# Patient Record
Sex: FEMALE | Race: WHITE
[De-identification: names, ages, dates, MRNs, and addresses within clinical notes are randomized per-mention and may not be internally consistent; named-entity substitution may affect disease eponyms.]

---

## 2019-06-04 ENCOUNTER — HOSPITAL ENCOUNTER (INPATIENT)
Dept: HOSPITAL 74 - FER | Age: 79
LOS: 4 days | Discharge: HOME HEALTH | DRG: 540 | End: 2019-06-08
Attending: NURSE PRACTITIONER | Admitting: GENERAL ACUTE CARE HOSPITAL
Payer: COMMERCIAL

## 2019-06-04 VITALS — BODY MASS INDEX: 28.7 KG/M2

## 2019-06-04 DIAGNOSIS — M46.46: ICD-10-CM

## 2019-06-04 DIAGNOSIS — F41.9: ICD-10-CM

## 2019-06-04 DIAGNOSIS — E78.5: ICD-10-CM

## 2019-06-04 DIAGNOSIS — I10: ICD-10-CM

## 2019-06-04 DIAGNOSIS — E87.1: ICD-10-CM

## 2019-06-04 DIAGNOSIS — M86.9: Primary | ICD-10-CM

## 2019-06-04 LAB
ALBUMIN SERPL-MCNC: 3.7 G/DL (ref 3.4–5)
ALP SERPL-CCNC: 65 U/L (ref 45–117)
ALT SERPL-CCNC: 11 U/L (ref 13–61)
ANION GAP SERPL CALC-SCNC: 11 MMOL/L (ref 8–16)
ANION GAP SERPL CALC-SCNC: 12 MMOL/L (ref 8–16)
ANION GAP SERPL CALC-SCNC: 9 MMOL/L (ref 8–16)
AST SERPL-CCNC: 22 U/L (ref 15–37)
BASOPHILS # BLD: 0.2 % (ref 0–2)
BILIRUB SERPL-MCNC: 0.7 MG/DL (ref 0.2–1)
BUN SERPL-MCNC: 16 MG/DL (ref 7–18)
BUN SERPL-MCNC: 17 MG/DL (ref 7–18)
BUN SERPL-MCNC: 18 MG/DL (ref 7–18)
CALCIUM SERPL-MCNC: 7.7 MG/DL (ref 8.5–10)
CALCIUM SERPL-MCNC: 8.1 MG/DL (ref 8.5–10)
CALCIUM SERPL-MCNC: 8.4 MG/DL (ref 8.5–10)
CHLORIDE SERPL-SCNC: 101 MMOL/L (ref 98–107)
CHLORIDE SERPL-SCNC: 93 MMOL/L (ref 98–107)
CHLORIDE SERPL-SCNC: 95 MMOL/L (ref 98–107)
CO2 SERPL-SCNC: 22 MMOL/L (ref 21–32)
CO2 SERPL-SCNC: 23 MMOL/L (ref 21–32)
CO2 SERPL-SCNC: 24 MMOL/L (ref 21–32)
CREAT SERPL-MCNC: 0.9 MG/DL (ref 0.55–1.3)
DEPRECATED RDW RBC AUTO: 12.8 % (ref 11.6–15.6)
EOSINOPHIL # BLD: 0.1 % (ref 0–4.5)
GLUCOSE SERPL-MCNC: 109 MG/DL (ref 74–106)
GLUCOSE SERPL-MCNC: 166 MG/DL (ref 74–106)
GLUCOSE SERPL-MCNC: 99 MG/DL (ref 74–106)
HCT VFR BLD CALC: 33.1 % (ref 32.4–45.2)
HGB BLD-MCNC: 11.1 GM/DL (ref 10.7–15.3)
HYALINE CASTS URNS QL MICRO: (no result) /LPF
LYMPHOCYTES # BLD: 7.7 % (ref 8–40)
MCH RBC QN AUTO: 30.8 PG (ref 25.7–33.7)
MCHC RBC AUTO-ENTMCNC: 33.7 G/DL (ref 32–36)
MCV RBC: 91.6 FL (ref 80–96)
MONOCYTES # BLD AUTO: 5.5 % (ref 3.8–10.2)
MUCOUS THREADS URNS QL MICRO: (no result)
NEUTROPHILS # BLD: 86.5 % (ref 42.8–82.8)
PLATELET # BLD AUTO: 175 K/MM3 (ref 134–434)
PMV BLD: 9.5 FL (ref 7.5–11.1)
POTASSIUM SERPLBLD-SCNC: 3.3 MMOL/L (ref 3.5–5.1)
POTASSIUM SERPLBLD-SCNC: 3.4 MMOL/L (ref 3.5–5.1)
POTASSIUM SERPLBLD-SCNC: 3.8 MMOL/L (ref 3.5–5.1)
PROT SERPL-MCNC: 6.5 G/DL (ref 6.4–8.2)
RBC # BLD AUTO: 3.61 M/MM3 (ref 3.6–5.2)
SODIUM SERPL-SCNC: 128 MMOL/L (ref 136–145)
SODIUM SERPL-SCNC: 130 MMOL/L (ref 136–145)
SODIUM SERPL-SCNC: 132 MMOL/L (ref 136–145)
WBC # BLD AUTO: 9.7 K/MM3 (ref 4–10)

## 2019-06-04 PROCEDURE — C1887 CATHETER, GUIDING: HCPCS

## 2019-06-04 PROCEDURE — G0378 HOSPITAL OBSERVATION PER HR: HCPCS

## 2019-06-04 PROCEDURE — C1751 CATH, INF, PER/CENT/MIDLINE: HCPCS

## 2019-06-04 PROCEDURE — G0480 DRUG TEST DEF 1-7 CLASSES: HCPCS

## 2019-06-04 NOTE — PDOC
History of Present Illness





- General


Chief Complaint: Back Pain


Stated Complaint: BACK PAIN,FEVER,CHILLS


Time Seen by Provider: 06/04/19 08:51


History Source: Patient


Exam Limitations: No Limitations





- History of Present Illness


Initial Comments: 





06/04/19 08:52


79 yo F with h/o chronic back pain, HTN, HLD arthritis, her with c/o fever and 

myalgia, chills. states she had fever 102 at home.  took tylenol this am. pt 

states she recieved a steroid injection in her back on 5/23. and again on 5/29. 

here visiting from [place]. last night developed fever. has had a cough for few 

days, no n/v no abd pain. no urinary complaints. pain is at baseline in her 

back no worse. no new weakness or numbness in her legs. no rash. no recent 

travel. no n/v/d.


meds: simvastatin, hctz, meloxicam, 


06/04/19 09:35





06/04/19 10:48








Past History





- Past Medical History


Allergies/Adverse Reactions: 


 Allergies











Allergy/AdvReac Type Severity Reaction Status Date / Time


 


No Known Allergies Allergy   Verified 06/04/19 08:55











Home Medications: 


Ambulatory Orders





Clonazepam 0 mg PO ASDIR 06/04/19 


Simvastatin 0 mg PO DAILY 06/04/19 











- Suicide/Smoking/Psychosocial Hx


Smoking History: Never smoked


Hx Alcohol Use: No


Drug/Substance Use Hx: No





**Review of Systems





- Review of Systems


Constitutional: Yes: Chills, Fever


HEENTM: No: Eye Pain


Respiratory: Yes: Cough.  No: Orthopnea, Shortness of Breath


Cardiac (ROS): No: Chest Pain, Edema


: No: Burning, Dysuria


Musculoskeletal: Yes: Back Pain.  No: Joint Pain


Integumentary: No: Bruising, Change in Color


All Other Systems: Reviewed and Negative





*Physical Exam





- Vital Signs


 Last Vital Signs











Temp Pulse Resp BP Pulse Ox


 


 99.5 F   80   20   109/56 L  96 


 


 06/04/19 08:46  06/04/19 08:46  06/04/19 08:46  06/04/19 08:46  06/04/19 08:46














- Physical Exam


Comments: 





06/04/19 08:54


awake alert NAD. dry mucous membranes. lungs crackles right lung fields 

anteriorly. heart rrr  no mrg abd soft nt nd ext wwp no edema. no calf 

tenderness. alert oriented x 3. skin warm and dry no rash. no midline spinal 

tenderness c/t/l spine. 5/5 lower ext strength. sensation intact throughout 

lower ext. 


06/04/19 09:53








**Heart Score/ECG Review


  ** #1


General ECG Interpretation: Sinus Rhythm, Normal Rate (78), Normal Intervals, 

No acute ischemic changes





ED Treatment Course





- LABORATORY


CBC & Chemistry Diagram: 


 06/04/19 09:00





 06/04/19 09:15





- RADIOLOGY


Radiology Studies Ordered: 














 Category Date Time Status


 


 CHEST PA & LAT [RAD] Stat Radiology  06/04/19 08:51 Ordered














Medical Decision Making





- Medical Decision Making





06/04/19 08:54


79 yo F h/o arthritis djd back pain recent injections 1 week ago here with 

cough fever. differential uti, pyelo, pna, sepsis, no midline tenderness over 

spine to suggest epidural abscess. plan labs ua cxr. if no source for fever 

consider mri r/o abscess. 


06/04/19 08:56


pain management doctor  Dr Díaz.  d/w dr díaz had medial 

nerves near facet joints, not near epidural space, but stil possible to seed in 

joint space. 








06/04/19 13:04


called pt pcp dr Abhishek Forrester  regaridng low sodium 128. MrI pending. 

called to request read. 


06/04/19 14:12


pt with mild headache. possible symptomatic hyponatriema, given 1 L NS will 

repeat. due to persistant fevers, hypoxia 93 %. will admit. cxr negative. 


06/04/19 14:31


mri findings wtih degenerative changes. no epidural fluid collection. however 

severe facet joint arthritis, difficult to differential inflammatory vs. 

infectious appearance per radiology . will cover vanco and zosy. pt with cough, 

sick contacts daughter and granddaughter with cough viral syndrome uri. however 

due to recent procedure, will cover for possible synovitis of spine. d/w Lana 

for admission Phylicia casiano, will cosult infectious disease on admission.





*DC/Admit/Observation/Transfer


Diagnosis at time of Disposition: 


 Hyponatremia, Cough








- Discharge Dispostion


Condition at time of disposition: Fair


Decision to Admit order: Yes





- Referrals





- Patient Instructions





- Post Discharge Activity

## 2019-06-04 NOTE — HP
CHIEF COMPLAINT: Fever, chills





PCP: in [place]





HISTORY OF PRESENT ILLNESS:


78 year-old female with a PMH significant for HTN, HLD, OA, and chronic back 

pain. Arrived in [place] on 5/30 from [place] to visit daughter. Presented to ED with 

complaint of home-recorded fever of 102.9, myalgias, and chills since last 

night. Patient reports having lumbar spine steroid injections for the first 

time on 5/23 and 5/29 with a pain management doctor in [place]. Patient has had 

a mild non-productive cough, visiting with young family members with assorted 

upper respiratory symptoms. No nausea, vomiting, diarrhea. No dysuria, frequency

, urgency. 





ER course was notable for:


(1) Na 129


(2) MRI LSS with contrast: active inflammatory facet synovitis L3-L4-L5 with 

soft tissue inflammation; L5-S1 soft tissue inflammation AND bone marrow 

enhancement





Recent Travel:


From [facility] to [place] 5 days ago; was supposed to fly back today





PAST MEDICAL HISTORY:


Hypertension


Hyperlipidemia


Osteoarthritis


Chronic back pain





PAST SURGICAL HISTORY:


Bilateral meniscus repairs


DANI/BLSO


Sinus polyps





Social History: retired , lives with  


Smoking: quit 50 years ago


Alcohol: rare


Drugs: no





Family History: non-contributory





Allergies


No Known Allergies Allergy (Verified 06/04/19 08:55)


 





 Home Medications











 Medication  Instructions  Recorded


 


Benazepril/Hydrochlorothiazide 1 each PO BID 06/04/19





[Benazepril-Hctz 10-12.5 mg Tab]  


 


Clonazepam 1 mg PO HS 06/04/19


 


Lamotrigine 100 mg PO BID 06/04/19


 


Meloxicam [Mobic (Nf) -] 15 mg PO DAILY 06/04/19


 


Pramipexole Dihydrochloride 0.25 mg PO HS 06/04/19


 


Simvastatin 20 mg PO HS 06/04/19


 


traZODone HCL [Trazodone HCl] 50 mg PO HS 06/04/19














REVIEW OF SYSTEMS


CONSTITUTIONAL: 


+fever, chills


Absent:  diaphoresis, generalized weakness, malaise, loss of appetite, weight 

change


HEENT: 


Absent:  rhinorrhea, nasal congestion, throat pain, throat swelling, difficulty 

swallowing, mouth swelling, ear pain, eye pain, visual changes


CARDIOVASCULAR: 


Absent: chest pain, syncope, palpitations, irregular heart rate, lightheadedness

, peripheral edema


RESPIRATORY: 


Absent: cough, shortness of breath, dyspnea with exertion, orthopnea, wheezing, 

stridor, hemoptysis


GASTROINTESTINAL:


Absent: abdominal pain, abdominal distension, nausea, vomiting, diarrhea, 

constipation, melena, hematochezia


GENITOURINARY: 


Absent: dysuria, frequency, urgency, hesitancy, hematuria, flank pain, genital 

pain


MUSCULOSKELETAL: 


+back pain


Absent: myalgia, arthralgia, joint swelling, neck pain


SKIN: 


Absent: rash, itching, pallor


HEMATOLOGIC/IMMUNOLOGIC: 


Absent: easy bleeding, easy bruising, lymphadenopathy, frequent infections


ENDOCRINE:


Absent: unexplained weight gain, unexplained weight loss, heat intolerance, 

cold intolerance


NEUROLOGIC: 


Absent: headache, focal weakness or paresthesias, dizziness, unsteady gait, 

seizure, mental status changes, bladder or bowel incontinence


PSYCHIATRIC: 


Absent: anxiety, depression, suicidal or homicidal ideation, hallucinations.








PHYSICAL EXAMINATION


 Vital Signs - 24 hr











  06/04/19 06/04/19





  08:46 13:22


 


Temperature 99.5 F 102.9 F H


 


Pulse Rate 80 


 


Pulse Rate [  78





Right Radial]  


 


Respiratory 20 





Rate  


 


Blood Pressure 109/56 L 


 


Blood Pressure  95/68





[Left]  


 


O2 Sat by Pulse 96 95





Oximetry (%)  











GENERAL: Awake, alert, and fully oriented, in no acute distress. Well-

appearing. Mildly anxious.


LUNGS: Breath sounds equal, clear to auscultation bilaterally. No wheezes, and 

no crackles. No accessory muscle use.


HEART: Regular rate and rhythm, normal S1 and S2 without murmur, rub or gallop.


ABDOMEN: Soft, nontender, not distended, normoactive bowel sounds, no guarding, 

no rebound, no masses.  


MUSCULOSKELETAL: Normal range of motion at all joints. No bony deformities or 

tenderness. No CVA tenderness.


UPPER EXTREMITIES: 2+ pulses, warm, well-perfused. No cyanosis. No clubbing. No 

peripheral edema.


LOWER EXTREMITIES: 2+ pulses, warm, well-perfused. No calf tenderness. No 

peripheral edema. 


NEUROLOGICAL:  Cranial nerves II-XII intact. Normal speech. 





 Laboratory Results - last 24 hr











  06/04/19 06/04/19 06/04/19





  09:00 09:00 09:00


 


WBC  9.7  


 


RBC  3.61  


 


Hgb  11.1  


 


Hct  33.1  


 


MCV  91.6  


 


MCH  30.8  


 


MCHC  33.7  


 


RDW  12.8  


 


Plt Count  175  


 


MPV  9.5  


 


Absolute Neuts (auto)  8.4 H  


 


Neutrophils %  86.5 H  


 


Lymphocytes %  7.7 L  


 


Monocytes %  5.5  


 


Eosinophils %  0.1  


 


Basophils %  0.2  


 


Nucleated RBC %  0  


 


ESR   


 


Sodium   


 


Potassium   


 


Chloride   


 


Carbon Dioxide   


 


Anion Gap   


 


BUN   


 


Creatinine   


 


Est GFR (CKD-EPI)AfAm   


 


Est GFR (CKD-EPI)NonAf   


 


Random Glucose   


 


Lactic Acid    0.7


 


Calcium   


 


Total Bilirubin   


 


AST   


 


ALT   


 


Alkaline Phosphatase   


 


C-Reactive Protein   


 


Total Protein   


 


Albumin   


 


Urine Color   Liberty 


 


Urine Appearance   Clear 


 


Urine pH   6.0 


 


Urine Protein   Trace 


 


Urine Glucose (UA)   Negative 


 


Urine Ketones   Trace 


 


Urine Blood   3+ H 


 


Urine Nitrite   Negative 


 


Urine Bilirubin   Negative 


 


Urine Urobilinogen   0.2 


 


Ur Leukocyte Esterase   Negative 


 


Urine RBC   5-10 


 


Urine WBC   0-3 


 


Urine Bacteria   1+ 


 


Hyaline Casts   5-10 


 


Urine Mucus   1+ 














  06/04/19 06/04/19 06/04/19





  09:15 09:42 09:42


 


WBC   


 


RBC   


 


Hgb   


 


Hct   


 


MCV   


 


MCH   


 


MCHC   


 


RDW   


 


Plt Count   


 


MPV   


 


Absolute Neuts (auto)   


 


Neutrophils %   


 


Lymphocytes %   


 


Monocytes %   


 


Eosinophils %   


 


Basophils %   


 


Nucleated RBC %   


 


ESR    46 H


 


Sodium  128 L  


 


Potassium  3.4 L  


 


Chloride  93 L  


 


Carbon Dioxide  23  


 


Anion Gap  12  


 


BUN  18  


 


Creatinine  0.9  


 


Est GFR (CKD-EPI)AfAm  71.48  


 


Est GFR (CKD-EPI)NonAf  61.67  


 


Random Glucose  109 H  


 


Lactic Acid   


 


Calcium  8.4 L  


 


Total Bilirubin  0.7  


 


AST  22  


 


ALT  11 L  


 


Alkaline Phosphatase  65  


 


C-Reactive Protein   7.8 H 


 


Total Protein  6.5  


 


Albumin  3.7  


 


Urine Color   


 


Urine Appearance   


 


Urine pH   


 


Urine Protein   


 


Urine Glucose (UA)   


 


Urine Ketones   


 


Urine Blood   


 


Urine Nitrite   


 


Urine Bilirubin   


 


Urine Urobilinogen   


 


Ur Leukocyte Esterase   


 


Urine RBC   


 


Urine WBC   


 


Urine Bacteria   


 


Hyaline Casts   


 


Urine Mucus   











ASSESSMENT/PLAN


78 year-old female with a PMH significant for HTN, HLD, OA, and chronic back 

pain. Admitted for suspected osteomyeltitis. 





Osteomyelitis


   --first time lumbar steroidal injections on 5/23 and 5/29; acute onset of 

symptoms of fever, chills last night


   --6/4 MRI LSS with contrast: active inflammatory facet synovitis L3-L4-L5 

with soft tissue inflammation; L5-S1 soft tissue inflammation AND bone marrow 

enhancement


   --started on Vanc and Zosyn


   --fluid resuscitated 2.5L in ED


   --ID consulted





Hyponatremia


   --Na 128, received 2.5L in ED


   --repeat bmp 9:00pm,


   --hold ACEI and HCTZ





Hypertension


   --concern for pending sepsis, hold home ACEI and HCTZ





Hyperlipidemia


   --continue statin





Anxiety


   --continue Lorazepam





FEN


   Fluids: NS@100mL/hr


   Electrolytes: replete as indicated


   Nutrition: low sodium








DVT prophylaxis: subq lovenox





Dispo: continues to require inpatient care. Full code. I have personally seen and examined the patient. I discussed the patient with CHAS Jama and agree with the NP's findings and plan as documented above in the NP's note with the following revisions made as necessary.     Also discussed with ID. Await plastics input for definitive source control. Wound culture ordered today as well.

## 2019-06-05 LAB
ALBUMIN SERPL-MCNC: 2.9 G/DL (ref 3.4–5)
ALP SERPL-CCNC: 85 U/L (ref 45–117)
ALT SERPL-CCNC: 27 U/L (ref 13–61)
ANION GAP SERPL CALC-SCNC: 8 MMOL/L (ref 8–16)
APTT BLD: 33.6 SECONDS (ref 25.2–36.5)
AST SERPL-CCNC: 47 U/L (ref 15–37)
BASOPHILS # BLD: 0.1 % (ref 0–2)
BILIRUB SERPL-MCNC: 0.3 MG/DL (ref 0.2–1)
BUN SERPL-MCNC: 13 MG/DL (ref 7–18)
CALCIUM SERPL-MCNC: 8 MG/DL (ref 8.5–10.1)
CHLORIDE SERPL-SCNC: 104 MMOL/L (ref 98–107)
CO2 SERPL-SCNC: 25 MMOL/L (ref 21–32)
CREAT SERPL-MCNC: 0.9 MG/DL (ref 0.55–1.3)
DEPRECATED RDW RBC AUTO: 13.4 % (ref 11.6–15.6)
EOSINOPHIL # BLD: 0 % (ref 0–4.5)
GLUCOSE SERPL-MCNC: 220 MG/DL (ref 74–106)
HCT VFR BLD CALC: 30.8 % (ref 32.4–45.2)
HGB BLD-MCNC: 10.4 GM/DL (ref 10.7–15.3)
INR BLD: 1.11 (ref 0.83–1.09)
LYMPHOCYTES # BLD: 7.3 % (ref 8–40)
MAGNESIUM SERPL-MCNC: 2.4 MG/DL (ref 1.8–2.4)
MCH RBC QN AUTO: 31 PG (ref 25.7–33.7)
MCHC RBC AUTO-ENTMCNC: 33.6 G/DL (ref 32–36)
MCV RBC: 92.1 FL (ref 80–96)
MONOCYTES # BLD AUTO: 2.1 % (ref 3.8–10.2)
NEUTROPHILS # BLD: 90.5 % (ref 42.8–82.8)
PLATELET # BLD AUTO: 147 K/MM3 (ref 134–434)
PMV BLD: 9 FL (ref 7.5–11.1)
POTASSIUM SERPLBLD-SCNC: 3.7 MMOL/L (ref 3.5–5.1)
PROT SERPL-MCNC: 5.9 G/DL (ref 6.4–8.2)
PT PNL PPP: 13.1 SEC (ref 9.7–13)
RBC # BLD AUTO: 3.35 M/MM3 (ref 3.6–5.2)
SODIUM SERPL-SCNC: 137 MMOL/L (ref 136–145)
WBC # BLD AUTO: 7.2 K/MM3 (ref 4–10)

## 2019-06-05 RX ADMIN — ATORVASTATIN CALCIUM SCH MG: 10 TABLET, FILM COATED ORAL at 21:52

## 2019-06-05 RX ADMIN — PIPERACILLIN AND TAZOBACTAM SCH MLS/HR: 4; .5 INJECTION, POWDER, LYOPHILIZED, FOR SOLUTION INTRAVENOUS at 01:32

## 2019-06-05 RX ADMIN — PIPERACILLIN AND TAZOBACTAM SCH MLS/HR: 4; .5 INJECTION, POWDER, LYOPHILIZED, FOR SOLUTION INTRAVENOUS at 09:56

## 2019-06-05 RX ADMIN — LAMOTRIGINE SCH MG: 100 TABLET ORAL at 21:53

## 2019-06-05 RX ADMIN — PRAMIPEXOLE DIHYDROCHLORIDE SCH MG: 0.25 TABLET ORAL at 21:52

## 2019-06-05 RX ADMIN — CEFTRIAXONE SCH MLS/HR: 2 INJECTION, SOLUTION INTRAVENOUS at 00:13

## 2019-06-05 RX ADMIN — CEFTRIAXONE SCH: 2 INJECTION, SOLUTION INTRAVENOUS at 12:19

## 2019-06-05 NOTE — CON.ID
Consult





- Past Medical History


...Pregnant: No





- Alcohol/Substance Use


Hx Alcohol Use: No





- Smoking History


Smoking history: Never smoked





Home Medications





- Allergies


Allergies/Adverse Reactions: 


 Allergies











Allergy/AdvReac Type Severity Reaction Status Date / Time


 


No Known Allergies Allergy   Verified 06/04/19 08:55














- Home Medications


Home Medications: 


Ambulatory Orders





Benazepril/Hydrochlorothiazide [Benazepril-Hctz 10-12.5 mg Tab] 1 each PO BID 06 /04/19 


Clonazepam 1 mg PO HS 06/04/19 


Lamotrigine 100 mg PO BID 06/04/19 


Meloxicam [Mobic (Nf) -] 15 mg PO DAILY 06/04/19 


Pramipexole Dihydrochloride 0.25 mg PO HS 06/04/19 


Simvastatin 20 mg PO HS 06/04/19 


traZODone HCL [Trazodone HCl] 50 mg PO HS 06/04/19 











Physical Exam


Vital Signs: 


 Vital Signs











Temperature  97.8 F   06/05/19 10:00


 


Pulse Rate  63   06/05/19 12:00


 


Respiratory Rate  25 H  06/05/19 12:00


 


Blood Pressure  119/71   06/05/19 12:00


 


O2 Sat by Pulse Oximetry (%)  99   06/05/19 07:44











Labs: 


 CBC, BMP





 06/05/19 05:46 





 06/05/19 05:46

## 2019-06-05 NOTE — PN
Teaching Attending Note


Name of Resident: Kayla Escamilla





ATTENDING PHYSICIAN STATEMENT





I saw and evaluated the patient.


I reviewed the resident's note and discussed the case with the resident.


I agree with the resident's findings and plan as documented.








SUBJECTIVE:





Pt seen and examined in the ICU. Fever curve trending down. Does report some 

left sided back pain. No neck pain or photophobia.





OBJECTIVE:





 Vital Signs











 Period  Temp  Pulse  Resp  BP Sys/Javed  Pulse Ox


 


 Last 24 Hr  97.8 F-102.9 F  54-78  18-26  /49-79  








 Intake & Output











 06/02/19 06/03/19 06/04/19 06/05/19





 23:59 23:59 23:59 23:59


 


Intake Total   1450 700


 


Output Total   350 


 


Balance   1100 700


 


Weight   66.678 kg 








Gen:  NAD at rest


Neck: no rigidity, nontender


Heart: RRR


Lung: decreased breath sounds at the bases


Abd: soft, nontender


Ext: no edema





 CBC, BMP





 06/05/19 05:46 





 06/05/19 05:46 





Active Medications





Acetaminophen (Tylenol -)  650 mg PO Q6H PRN


   PRN Reason: PAIN LEVEL 1-5


   Last Admin: 06/04/19 21:06 Dose:  650 mg


Atorvastatin Calcium (Lipitor -)  10 mg PO HS [facility]


Chlorhexidine Gluconate (Hibiclens For Decolonization -)  1 applic TP HS [facility]


Clonazepam (Klonopin -)  1 mg PO HS [facility]


Enoxaparin Sodium (Lovenox -)  40 mg SQ DAILY [facility]


   Last Admin: 06/05/19 09:55 Dose:  40 mg


Piperacillin Sod/Tazobactam (Sod 4.5 gm/ Dextrose)  100 mls @ 200 mls/hr IVPB 

Q8H-IV [facility]; Protocol


Vancomycin HCl (Vancomycin (Pre-Docked))  1,000 mg in 250 mls @ 166.667 mls/hr 

IVPB Q12H [facility]; Protocol


Sodium Chloride (Normal Saline -)  1,000 mls @ 75 mls/hr IV ASDIR [facility]


   Stop: 06/05/19 12:49


   Last Admin: 06/05/19 03:17 Dose:  75 mls/hr


Ceftriaxone Sodium 2 gm/ (Dextrose)  100 mls @ 200 mls/hr IVPB DAILY [facility]; 

Protocol


Lamotrigine (Lamictal -)  100 mg PO BID [facility]


Mupirocin (Bactroban Ointment (For Decolonization) -)  1 applic NS BID [facility]


   Stop: 06/10/19 09:59


   Last Admin: 06/05/19 09:55 Dose:  1 applic


Pramipexole Dihydrochloride (Mirapex -)  0.25 mg PO HS [facility]








ASSESSMENT AND PLAN:


r/o Osteomyelitis


Sepsis


Hyponatremia


HTN


Hyperlipidemia


Anxiety





-  antibiotics per ID


-  f/u cultures


-  IVF


-  monitor urine output, creatinine


-  PO as tolerated


-  DVT prophylaxis


-  can monitor on floor

## 2019-06-05 NOTE — EKG
Test Reason : 

Blood Pressure : ***/*** mmHG

Vent. Rate : 078 BPM     Atrial Rate : 078 BPM

   P-R Int : 146 ms          QRS Dur : 096 ms

    QT Int : 392 ms       P-R-T Axes : 026 005 028 degrees

   QTc Int : 446 ms

 

SINUS RHYTHM WITH PREMATURE ATRIAL COMPLEXES

OTHERWISE NORMAL ECG

NO PREVIOUS ECGS AVAILABLE

Confirmed by YURY VAZ, MAXX ([id number]) on 6/5/2019 9:28:22 AM

 

Referred By: LYNDA CROCKER           Confirmed By:MAXX COTTON MD

## 2019-06-05 NOTE — PN
Progress Note, Physician


Chief Complaint: 





lower back paiin, fever and chills x 2 days


History of Present Illness: 


78 year-old female with a PMH significant for HTN, HLD, OA, and chronic back 

pain. Arrived in [place] on 5/30 from [place] to visit daughter. Presented to ED with 

complaint of home-recorded fever of 102.9, myalgias, and chills since last 

night. Patient reports having lumbar spine steroid injections for the first 

time on 5/23 and 5/29 with a pain management doctor in [place]. Patient has had 

a mild non-productive cough, visiting with young family members with assorted 

upper respiratory symptoms. No nausea, vomiting, diarrhea. No dysuria, frequency

, urgency. 











- Current Medication List


Current Medications: 


Active Medications





Acetaminophen (Tylenol -)  650 mg PO Q6H PRN


   PRN Reason: PAIN LEVEL 1-5


   Last Admin: 06/04/19 21:06 Dose:  650 mg


Atorvastatin Calcium (Lipitor -)  10 mg PO HS [facility]


Chlorhexidine Gluconate (Hibiclens For Decolonization -)  1 applic TP HS [facility]


Clonazepam (Klonopin -)  1 mg PO HS [facility]


Enoxaparin Sodium (Lovenox -)  40 mg SQ DAILY [facility]


Piperacillin Sod/Tazobactam (Sod 4.5 gm/ Dextrose)  100 mls @ 200 mls/hr IVPB 

Q8H-IV [facility]; Protocol


Vancomycin HCl (Vancomycin (Pre-Docked))  1,000 mg in 250 mls @ 166.667 mls/hr 

IVPB Q12H [facility]; Protocol


Piperacillin Sod/Tazobactam (Sod 4.5 gm/ Dextrose)  100 mls @ 200 mls/hr IVPB 

Q8H-IV [facility]; Protocol


   Stop: 06/05/19 10:29


   Last Admin: 06/05/19 01:32 Dose:  200 mls/hr


Ceftriaxone Sodium (Ceftriaxone 2 Gm-D5w Bag)  2 gm in 50 mls @ 100 mls/hr IVPB 

DAILY [facility]; Protocol


   Last Admin: 06/05/19 00:13 Dose:  100 mls/hr


Sodium Chloride (Normal Saline -)  1,000 mls @ 75 mls/hr IV ASDIR [facility]


   Stop: 06/05/19 12:49


   Last Admin: 06/05/19 03:17 Dose:  75 mls/hr


Lamotrigine (Lamictal -)  100 mg PO BID [facility]


Mupirocin (Bactroban Ointment (For Decolonization) -)  1 applic NS BID [facility]


   Stop: 06/10/19 09:59


Pramipexole Dihydrochloride (Mirapex -)  0.25 mg PO [facility]











- Objective


Vital Signs: 


 Vital Signs











Temperature  97.8 F   06/05/19 06:00


 


Pulse Rate  60   06/05/19 06:00


 


Respiratory Rate  24 H  06/05/19 06:00


 


Blood Pressure  114/79   06/05/19 06:00


 


O2 Sat by Pulse Oximetry (%)  99   06/05/19 04:19











Constitutional: Yes: Well Nourished, No Distress, Calm


Eyes: Yes: WNL, Conjunctiva Clear, EOM Intact


HENT: Yes: WNL, Atraumatic, Normocephalic


Neck: Yes: WNL, Supple, Trachea Midline


Cardiovascular: Yes: WNL, Regular Rate and Rhythm


Respiratory: Yes: WNL, Regular, CTA Bilaterally


Gastrointestinal: Yes: WNL, Normal Bowel Sounds, Soft


...Rectal Exam: Yes: Deferred


Genitourinary: Yes: WNL


Breast(s): Yes: WNL


Musculoskeletal: Yes: Back Pain (lumbar R>L)


Extremities: Yes: WNL


Edema: No


Peripheral Pulses WNL: Yes


Integumentary: Yes: WNL


Neurological: Yes: WNL, Alert, Oriented


...Motor Strength: WNL


Psychiatric: Yes: WNL, Alert, Oriented (MRI spine:Moderate rotatory 

levoscoliosis of lumbosacral spine.   Multilevel central spinal canal stenosis, 

neural foramina narrowing,   disc space narrowing, degenerative endplate bone 

marrow changes, spondylolisthesis.     Active inflammatory facet arthropathy (

Facet synovitis).   L3-L4. Enhancement of the right para facet soft tissues   L4

-L5. Enhancement of the bilateral para facet soft tissues   L5-S1. Enhancement 

of the left digna facetal  soft tissues, bone marrow enhancement.     No 

definite abscess is seen within the limitation of examination.)


Labs: 


 CBC, BMP





 06/05/19 05:46 











- ....Imaging


Chest X-ray: Image Reviewed


MRI: Report Reviewed (IMPRESSION.     Limited study due to the motion 

artifacts.     Moderate rotatory levoscoliosis of lumbosacral spine.   

Multilevel central spinal canal stenosis, neural foramina narrowing,   disc 

space narrowing, degenerative endplate bone marrow changes, spondylolisthesis. 

    Active inflammatory facet arthropathy (Facet synovitis).   L3-L4. 

Enhancement of the right para facet soft tissues   L4-L5. Enhancement of the 

bilateral para facet soft tissues   L5-S1. Enhancement of the left digna 

facetal  soft tissues, bone marrow enhancement.     No definite abscess is seen 

within the limitation of examination.)





Problem List





- Problems


(1) Anxiety


Assessment/Plan: 


continue  home dose of klonopin.


emotional support provided


spoke with aptient with daughter and  present


Code(s): F41.9 - ANXIETY DISORDER, UNSPECIFIED   





(2) Back pain


Assessment/Plan: 


treating for presumed osteomyleotitis


back with chronic LBP recently treated with steroid injection


may apply ice for 20 min intervals to lower back





Code(s): M54.9 - DORSALGIA, UNSPECIFIED   





(3) HTN (hypertension)


Assessment/Plan: 


home dose of benzepril held, normotensive presently


will reasse daily for re-introduction on antihypertensives


Code(s): I10 - ESSENTIAL (PRIMARY) HYPERTENSION   





(4) Hyponatremia


Assessment/Plan: 


resolved


Na 137


daily CMP


Code(s): E87.1 - HYPO-OSMOLALITY AND HYPONATREMIA   





(5) Osteomyelitis


Assessment/Plan: 


on MRI patient found to have active inflammatory facet synovitis L3-L4-L5 w/ 

soft tissue inflammation; L5-S1 soft tissue inflammation and bone marrow 

enhancement


-patient started on empiric Zosyn/Ceftriaxone/Vancomycin in ED. Cont citraixone 

pending cultures. 


-Dr. José keen








Code(s): M86.9 - OSTEOMYELITIS, UNSPECIFIED   





Impression/Plan


Impression/Plan: 








F/E/N


NS @75mls/hr


monitor electrolytes


sodium controlled diet





dvt PPX: lovenox sq


Gi PPX: not needed





dispo: can transfer to med-surg as per Critical Care





Visit type





- Emergency Visit


Emergency Visit: Yes


ED Registration Date: 06/05/19


Care time: The patient presented to the Emergency Department on the above date 

and was hospitalized for further evaluation of their emergent condition.





- New Patient


This patient is new to me today: Yes


Date on this admission: 06/05/19





- Critical Care


Critical Care patient: Yes


Total Critical Care Time (in minutes): 30


Critical Care Statement: The care of this patient involved high complexity 

decision making to prevent further life threatening deterioration of the patient

's condition and/or to evaluate & treat vital organ system(s) failure or risk 

of failure.

## 2019-06-05 NOTE — PN
Physical Exam: 


SUBJECTIVE: Patient seen and examined at bedside- no acute events overnight; 

patient states that she is feeling OK is having some slight pains in her back; 

she has been afebrile since being transferred from [facility]- she denies any CP/SOB/

N/V fevers or chills 








OBJECTIVE:





 Vital Signs











 Period  Temp  Pulse  Resp  BP Sys/Javed  Pulse Ox


 


 Last 24 Hr  97.8 F-102.9 F  54-78  18-26  /49-79  











GENERAL: The patient is awake, alert, and fully oriented, in no acute distress.


EYES: PEERLA: EOMI; no sceral icterus .


NECK:no JVD; no lymphadenopathy


LUNGS: CTA B/L; no rales, rhonchi or wheezing


HEART: Regular rate and rhythm, S1, S2 without murmur, rub or gallop.


ABDOMEN: Soft, nontender, nondistended, normoactive bowel sounds, no guarding, 

no 


rebound, no hepatosplenomegaly, no masses.


EXTREMITIES: 2+ pulses, warm, well-perfused, no edema. 


MUSCULOSKELETAL: no midline spinal tenderness; slight lateral tenderness 


PSYCH: Normal mood, normal affect.


SKIN: Warm, dry, normal turgor, no rashes or lesions noted














 Laboratory Results - last 24 hr











  06/04/19 06/04/19 06/04/19





  09:42 13:15 18:20


 


WBC   


 


RBC   


 


Hgb   


 


Hct   


 


MCV   


 


MCH   


 


MCHC   


 


RDW   


 


Plt Count   


 


MPV   


 


Absolute Neuts (auto)   


 


Neutrophils %   


 


Lymphocytes %   


 


Monocytes %   


 


Eosinophils %   


 


Basophils %   


 


Nucleated RBC %   


 


PT with INR   


 


INR   


 


PTT (Actin FS)   


 


Sodium   130 L 


 


Potassium   3.3 L 


 


Chloride   95 L 


 


Carbon Dioxide   24 


 


Anion Gap   11 


 


BUN   16 


 


Creatinine   0.9 


 


Est GFR (CKD-EPI)AfAm   71.48 


 


Est GFR (CKD-EPI)NonAf   61.67 


 


Random Glucose   99 


 


Lactic Acid   


 


Calcium   8.1 L 


 


Magnesium    1.7 L


 


Total Bilirubin   


 


AST   


 


ALT   


 


Alkaline Phosphatase   


 


C-Reactive Protein  7.8 H  


 


Total Protein   


 


Albumin   














  06/04/19 06/04/19 06/05/19





  21:00 22:45 05:46


 


WBC    7.2


 


RBC    3.35 L


 


Hgb    10.4 L


 


Hct    30.8 L


 


MCV    92.1


 


MCH    31.0


 


MCHC    33.6


 


RDW    13.4


 


Plt Count    147


 


MPV    9.0


 


Absolute Neuts (auto)    6.5


 


Neutrophils %    90.5 H


 


Lymphocytes %    7.3 L


 


Monocytes %    2.1 L


 


Eosinophils %    0.0  D


 


Basophils %    0.1


 


Nucleated RBC %    0


 


PT with INR   


 


INR   


 


PTT (Actin FS)   


 


Sodium   132 L 


 


Potassium   3.8 


 


Chloride   101 


 


Carbon Dioxide   22 


 


Anion Gap   9 


 


BUN   17 


 


Creatinine   0.9 


 


Est GFR (CKD-EPI)AfAm   71.48 


 


Est GFR (CKD-EPI)NonAf   61.67 


 


Random Glucose   166 H 


 


Lactic Acid  1.9  


 


Calcium   7.7 L 


 


Magnesium   


 


Total Bilirubin   


 


AST   


 


ALT   


 


Alkaline Phosphatase   


 


C-Reactive Protein   


 


Total Protein   


 


Albumin   














  06/05/19 06/05/19 06/05/19





  05:46 05:46 06:00


 


WBC   


 


RBC   


 


Hgb   


 


Hct   


 


MCV   


 


MCH   


 


MCHC   


 


RDW   


 


Plt Count   


 


MPV   


 


Absolute Neuts (auto)   


 


Neutrophils %   


 


Lymphocytes %   


 


Monocytes %   


 


Eosinophils %   


 


Basophils %   


 


Nucleated RBC %   


 


PT with INR  Cancelled   13.10 H


 


INR  Cancelled   1.11 H


 


PTT (Actin FS)    33.6


 


Sodium   137 


 


Potassium   3.7 


 


Chloride   104 


 


Carbon Dioxide   25 


 


Anion Gap   8 


 


BUN   13 


 


Creatinine   0.9 


 


Est GFR (CKD-EPI)AfAm   71.48 


 


Est GFR (CKD-EPI)NonAf   61.67 


 


Random Glucose   220 H 


 


Lactic Acid   


 


Calcium   8.0 L 


 


Magnesium   2.4 


 


Total Bilirubin   0.3 


 


AST   47 H 


 


ALT   27 


 


Alkaline Phosphatase   85 


 


C-Reactive Protein   


 


Total Protein   5.9 L 


 


Albumin   2.9 L 








Active Medications











Generic Name Dose Route Start Last Admin





  Trade Name Freq  PRN Reason Stop Dose Admin


 


Acetaminophen  650 mg  06/04/19 18:22  06/04/19 21:06





  Tylenol -  PO   650 mg





  Q6H PRN   Administration





  PAIN LEVEL 1-5   





     





     





     


 


Atorvastatin Calcium  10 mg  06/05/19 22:00  





  Lipitor -  PO   





  [facility]   





     





     





     





     


 


Chlorhexidine Gluconate  1 applic  06/05/19 22:00  





  Hibiclens For Decolonization -  TP   





  [facility]   





     





     





     





     


 


Clonazepam  1 mg  06/05/19 22:00  





  Klonopin -  PO   





  [facility]   





     





     





     





     


 


Enoxaparin Sodium  40 mg  06/05/19 10:00  06/05/19 09:55





  Lovenox -  SQ   40 mg





  [facility]   Administration





     





     





     





     


 


Piperacillin Sod/Tazobactam  100 mls @ 200 mls/hr  06/05/19 02:00  





  Sod 4.5 gm/ Dextrose  IVPB   





  Q8H-IV [facility]   





     





     





  Protocol   





     


 


Vancomycin HCl  1,000 mg in 250 mls @ 166.667 mls/hr  06/05/19 04:00  





  Vancomycin (Pre-Docked)  IVPB   





  Q12H [facility]   





     





     





  Protocol   





     


 


Sodium Chloride  1,000 mls @ 75 mls/hr  06/05/19 01:54  06/05/19 03:17





  Normal Saline -  IV  06/05/19 12:49  75 mls/hr





  [facility]   Administration





     





     





     





     


 


Ceftriaxone Sodium 2 gm/  100 mls @ 200 mls/hr  06/05/19 11:39  





  Dextrose  IVPB   





  [facility]   





     





     





  Protocol   





     


 


Lamotrigine  100 mg  06/05/19 10:00  





  Lamictal -  PO   





  BID [facility]   





     





     





     





     


 


Mupirocin  1 applic  06/05/19 10:00  06/05/19 09:55





  Bactroban Ointment (For Decolonization) -  NS  06/10/19 09:59  1 applic





  BID [facility]   Administration





     





     





     





     


 


Pramipexole Dihydrochloride  0.25 mg  06/05/19 22:00  





  Mirapex -  PO   





  [facility]   





     





     





     





     











ASSESSMENT/PLAN:


79 y/o female with PMH of HTN, HLD, OA, chronic back pain who presented to the 

ED with worsening fevers/myalgias and back pain after receiving 2 rounds of 

steroid injections last week, found to have possible osteomyelitis on MRI





#Cardio


HTN/HLD


-controlled


-c/w home medications





#Endocrine


patient was hyponatremic upon arrival at [facility] yesterday


-resolved


- Ns @75mls/hr


-monitor electrolytes





#Infectious Disease


on MRI patient found to have active inflammatory facet synovitis L3-L4-L5 w/ 

soft tissue inflammation; L5-S1 soft tissue inflammation and bone marrow 

enhancement


-patient started on empiric Zosyn/Ceftriaxone/Vancomycin- will continue with 

vancomycin as per Dr Kumar


-Dr. Kumar consulted


-will speak to radiology if definitely osteomyelitis





#Psych


anxiety/depression


-controlled


-c/w home medications





F/E/N


NS @75mls/hr


monitor electrolytes


sodium controlled diet





dvt PPX: lovenox sq


Gi PPX: not needed





dispo: can transfer to med-surg














Problem List





- Problems


(1) HTN (hypertension)


Code(s): I10 - ESSENTIAL (PRIMARY) HYPERTENSION   





(2) Back pain


Code(s): M54.9 - DORSALGIA, UNSPECIFIED   





(3) Osteomyelitis


Code(s): M86.9 - OSTEOMYELITIS, UNSPECIFIED   





Visit type





- Emergency Visit


Emergency Visit: Yes


ED Registration Date: 06/05/19


Care time: The patient presented to the Emergency Department on the above date 

and was hospitalized for further evaluation of their emergent condition.





- New Patient


This patient is new to me today: Yes


Date on this admission: 06/05/19





- Critical Care


Critical Care patient: Yes


Total Critical Care Time (in minutes): 35


Critical Care Statement: The care of this patient involved high complexity 

decision making to prevent further life threatening deterioration of the patient

's condition and/or to evaluate & treat vital organ system(s) failure or risk 

of failure.

## 2019-06-05 NOTE — CONSULT
Consultation: 


REQUESTING PROVIDER: Hospitalists


CONSULT Service: ICU Resident





PCP: Dr. Gonzáles ([place], [place])





HISTORY OF PRESENT ILLNESS:


   77yo F with h/o HTN, HLD, OA with chronic back pain who presents today with 

fever (max of 102.9 recorded at home), chills, and generalized weakness. Pt 

reports she was visiting her daughter here (flew from [place] 5/30/19) when she 

noticed her fever spiked yesterday to 102.9. After her fever developed she 

began to feel generalized weakness and had back pain similar to her usual pain, 

however more intense. Pt reports she received her first spinal steroid 

injections for her chronic back pain about 1.5-2wks prior 5/23 and 5/29 with a 

pain management physician she sees in [place]. In addition pt reports having a 

nonproductive cough that she attributes to being in contact with family members 

who have had an URI. Pt at this time denies any focal weakness, urinary or 

bowel incontinence, parasthesias of her legs, headaches, photophobia, dizziness/

lightheadedness, blurry vision, shortness of breath, CP/discomfort, palpitations

, abdominal pain.





In ED at [place] MRI of LS with constrast shows active inflammation of 

facets L3-L5 likely synovitis with soft tissue inflammation and bone marrow 

enhancement





Recent Travel: [place], [place] (5 days ago)





PAST MEDICAL HISTORY:


Hypertension


Hyperlipidemia


Osteoarthritis


Chronic back pain





PAST SURGICAL HISTORY:


Bilateral meniscus repairs


DANI/BLSO


Sinus polyps





Social History: retired , lives with  


Smoking: quit 50 years ago


Alcohol: Denies


Drugs: Denies





Family History: non-contributory





REVIEW OF SYSTEMS:


As per HPI





PHYSICAL EXAMINATION





 Vital Signs 











  06/04/19 06/04/19 06/04/19





  08:46 13:22 17:00


 


Temperature 99.5 F 102.9 F H 98.8 F


 


Pulse Rate 80  


 


Pulse Rate [  78 66





Right Radial]   


 


Respiratory 20  





Rate   


 


Blood Pressure 109/56 L  


 


Blood Pressure  95/68 90/56 L





[Left]   


 


O2 Sat by Pulse 96 95 95





Oximetry (%)   














  06/04/19 06/04/19 06/04/19





  18:02 18:31 21:00


 


Temperature 98.4 F  


 


Pulse Rate 77 74 


 


Pulse Rate [   





Right Radial]   


 


Respiratory 18 18 





Rate   


 


Blood Pressure 89/56 L 99/49 L 


 


Blood Pressure   





[Left]   


 


O2 Sat by Pulse 100  99





Oximetry (%)   














  06/04/19 06/04/19





  21:18 23:23


 


Temperature 101.4 F H 101.0 F H


 


Pulse Rate 78 73


 


Pulse Rate [  





Right Radial]  


 


Respiratory 18 19





Rate  


 


Blood Pressure 134/63 106/53 L


 


Blood Pressure  





[Left]  


 


O2 Sat by Pulse  





Oximetry (%)  














GENERAL: NAD, awake, alert, and fully oriented 


HEENT: NC/AT, EOMI, EDIS, no photophobia noted, sclera anicteric, MMM


NECK:  Supple, no pain with flexion, no lymphadenopathy


LUNGS: CTA bilaterally. No wheezes, and no crackles. No accessory muscle use.


HEART: RRR, normal S1 and S2 without murmur 


ABDOMEN: Soft, NT/ND, normoactive bowel sounds, no guarding


MSK: No skin changes overlying lumbar spine, no TTP with direct spinous process 

palpation, no CVA tenderness


EXTREMITIES: 2+ DP pulses, warm, well-perfused. No calf tenderness. No 

peripheral edema. 


NEUROLOGICAL:  CNs II-XII intact. Strength 5/5 in both upper and lower 

extremity fields. Sensation intact throughout lower extremities. Normal speech. 

negative Kernig's sign. Gait not observed


PSYCHIATRIC: Cooperative. Good eye contact. Appropriate mood and affect.


SKIN: Warm, dry, no rashes or lesions noted. 











 Laboratory Results











  06/04/19 06/04/19





  09:00 09:00


 


WBC  9.7 


 


RBC  3.61 


 


Hgb  11.1 


 


Hct  33.1 


 


MCV  91.6 


 


MCH  30.8 


 


MCHC  33.7 


 


RDW  12.8 


 


Plt Count  175 


 


MPV  9.5 


 


Absolute Neuts (auto)  8.4 H 


 


Neutrophils %  86.5 H 


 


Lymphocytes %  7.7 L 


 


Monocytes %  5.5 


 


Eosinophils %  0.1 


 


Basophils %  0.2 


 


Nucleated RBC %  0 


 


ESR  


 


Sodium  


 


Potassium  


 


Chloride  


 


Carbon Dioxide  


 


Anion Gap  


 


BUN  


 


Creatinine  


 


Est GFR (CKD-EPI)AfAm  


 


Est GFR (CKD-EPI)NonAf  


 


Random Glucose  


 


Lactic Acid  0.7


 


Calcium  


 


Magnesium  


 


Total Bilirubin  


 


AST  


 


ALT  


 


Alkaline Phosphatase  


 


C-Reactive Protein  


 


Total Protein  


 


Albumin  


 


Urine Color   Liberty


 


Urine Appearance   Clear


 


Urine pH   6.0


 


Urine Protein   Trace


 


Urine Glucose (UA)   Negative


 


Urine Ketones   Trace


 


Urine Blood   3+ H


 


Urine Nitrite   Negative


 


Urine Bilirubin   Negative


 


Urine Urobilinogen   0.2


 


Ur Leukocyte Esterase   Negative


 


Urine RBC   5-10


 


Urine WBC   0-3


 


Urine Bacteria   1+


 


Hyaline Casts   5-10


 


Urine Mucus   1+














  06/04/19





  09:15


 


WBC 


 


RBC 


 


Hgb 


 


Hct 


 


MCV 


 


MCH 


 


MCHC 


 


RDW 


 


Plt Count 


 


MPV 


 


Absolute Neuts (auto) 


 


Neutrophils % 


 


Lymphocytes % 


 


Monocytes % 


 


Eosinophils % 


 


Basophils % 


 


Nucleated RBC % 


 


ESR 46 H


 


Sodium  128 L


 


Potassium  3.4 L


 


Chloride  93 L


 


Carbon Dioxide  23


 


Anion Gap  12


 


BUN  18


 


Creatinine  0.9


 


Est GFR (CKD-EPI)AfAm  71.48


 


Est GFR (CKD-EPI)NonAf  61.67


 


Random Glucose  109 H


 


Lactic Acid 


 


Calcium  8.4 L


 


Magnesium 


 


Total Bilirubin  0.7


 


AST  22


 


ALT  11 L


 


Alkaline Phosphatase  65


 


C-Reactive Protein 7.8 H


 


Total Protein  6.5


 


Albumin  3.7


 


Urine Color 


 


Urine Appearance 


 


Urine pH 


 


Urine Protein 


 


Urine Glucose (UA) 


 


Urine Ketones 


 


Urine Blood 


 


Urine Nitrite 


 


Urine Bilirubin 


 


Urine Urobilinogen 


 


Ur Leukocyte Esterase 


 


Urine RBC 


 


Urine WBC 


 


Urine Bacteria 


 


Hyaline Casts 


 


Urine Mucus 














  06/04/19





  13:15


 


WBC 


 


RBC 


 


Hgb 


 


Hct 


 


MCV 


 


MCH 


 


MCHC 


 


RDW 


 


Plt Count 


 


MPV 


 


Absolute Neuts (auto) 


 


Neutrophils % 


 


Lymphocytes % 


 


Monocytes % 


 


Eosinophils % 


 


Basophils % 


 


Nucleated RBC % 


 


ESR 


 


Sodium  130 L


 


Potassium  3.3 L


 


Chloride  95 L


 


Carbon Dioxide  24


 


Anion Gap  11


 


BUN  16


 


Creatinine  0.9


 


Est GFR (CKD-EPI)AfAm  71.48


 


Est GFR (CKD-EPI)NonAf  61.67


 


Random Glucose  99


 


Lactic Acid 1.9


 


Calcium  8.1 L


 


Magnesium 1.7


 


Total Bilirubin 


 


AST 


 


ALT 


 


Alkaline Phosphatase 


 


C-Reactive Protein 


 


Total Protein 


 


Albumin 


 


Urine Color 


 


Urine Appearance 


 


Urine pH 


 


Urine Protein 


 


Urine Glucose (UA) 


 


Urine Ketones 


 


Urine Blood 


 


Urine Nitrite 


 


Urine Bilirubin 


 


Urine Urobilinogen 


 


Ur Leukocyte Esterase 


 


Urine RBC 


 


Urine WBC 


 


Urine Bacteria 


 


Hyaline Casts 


 


Urine Mucus 














  06/04/19





  22:45


 


WBC 


 


RBC 


 


Hgb 


 


Hct 


 


MCV 


 


MCH 


 


MCHC 


 


RDW 


 


Plt Count 


 


MPV 


 


Absolute Neuts (auto) 


 


Neutrophils % 


 


Lymphocytes % 


 


Monocytes % 


 


Eosinophils % 


 


Basophils % 


 


Nucleated RBC % 


 


ESR 


 


Sodium  132 L


 


Potassium  3.8


 


Chloride  101


 


Carbon Dioxide  22


 


Anion Gap  9


 


BUN  17


 


Creatinine  0.9


 


Est GFR (CKD-EPI)AfAm  71.48


 


Est GFR (CKD-EPI)NonAf  61.67


 


Random Glucose  166 H


 


Lactic Acid 


 


Calcium  7.7 L


 


Magnesium 


 


Total Bilirubin 


 


AST 


 


ALT 


 


Alkaline Phosphatase 


 


C-Reactive Protein 


 


Total Protein 


 


Albumin 


 


Urine Color 


 


Urine Appearance 


 


Urine pH 


 


Urine Protein 


 


Urine Glucose (UA) 


 


Urine Ketones 


 


Urine Blood 


 


Urine Nitrite 


 


Urine Bilirubin 


 


Urine Urobilinogen 


 


Ur Leukocyte Esterase 


 


Urine RBC 


 


Urine WBC 


 


Urine Bacteria 


 


Hyaline Casts 


 


Urine Mucus 








Active Medications











Generic Name Dose Route Start Last Admin





  Trade Name Freq  PRN Reason Stop Dose Admin


 


Acetaminophen  650 mg  06/04/19 18:22  06/04/19 21:06





  Tylenol -  PO   650 mg





  Q6H PRN   Administration





  PAIN LEVEL 1-5   





     





     





     


 


Atorvastatin Calcium  10 mg  06/05/19 22:00  





  Lipitor -  PO   





  [facility]   





     





     





     





     


 


Chlorhexidine Gluconate  1 applic  06/05/19 22:00  





  Hibiclens For Decolonization -  TP   





  [facility]   





     





     





     





     


 


Clonazepam  1 mg  06/05/19 22:00  





  Klonopin -  PO   





  [facility]   





     





     





     





     


 


Enoxaparin Sodium  40 mg  06/05/19 10:00  





  Lovenox -  SQ   





  [facility]   





     





     





     





     


 


Piperacillin Sod/Tazobactam  100 mls @ 200 mls/hr  06/05/19 02:00  





  Sod 4.5 gm/ Dextrose  IVPB   





  Q8H-IV [facility]   





     





     





  Protocol   





     


 


Vancomycin HCl  1,000 mg in 250 mls @ 166.667 mls/hr  06/05/19 04:00  





  Vancomycin (Pre-Docked)  IVPB   





  Q12H [facility]   





     





     





  Protocol   





     


 


Piperacillin Sod/Tazobactam  100 mls @ 200 mls/hr  06/05/19 02:00  





  Sod 4.5 gm/ Dextrose  IVPB  06/05/19 10:29  





  Q8H-IV [facility]   





     





     





  Protocol   





     


 


Vancomycin HCl  1,000 mg in 250 mls @ 166.667 mls/hr  06/05/19 04:00  





  Vancomycin (Pre-Docked)  IVPB  06/05/19 05:29  





  Q12H [facility]   





     





     





  Protocol   





     


 


Ceftriaxone Sodium  2 gm in 50 mls @ 100 mls/hr  06/04/19 23:21  06/05/19 00:13





  Ceftriaxone 2 Gm-D5w Bag  IVPB   100 mls/hr





  [facility]   Administration





     





     





  Protocol   





     


 


Sodium Chloride  1,000 mls @ 100 mls/hr  06/04/19 23:30  





  Normal Saline -  IV   





  [facility]   





     





     





     





     


 


Lamotrigine  100 mg  06/05/19 10:00  





  Lamictal -  PO   





  BID [facility]   





     





     





     





     


 


Mupirocin  1 applic  06/05/19 10:00  





  Bactroban Ointment (For Decolonization) -  NS  06/10/19 09:59  





  BID [facility]   





     





     





     





     


 


Pramipexole Dihydrochloride  0.25 mg  06/05/19 22:00  





  Mirapex -  PO   





  [facility]   





     





     





     





     











ASSESSMENT/PLAN:


Osteomyelitis of LS


Euvolemic hyponatremia


HTN


HLD


Anxiety





--Pt not exhibiting signs of meningitis nor concerning spinal abscess signs


--Given LS MRI likely osteomyelitis and not meningitis


--ID was consulted by primary team: Continue Rocephin, Vancomycin, Zosyn for now


--Continue NS@75cc/hr


--Monitor Hyponatremia; will likely increase with NS IVF


--Hold HCTZ fo rnow given hyponatremia


--Continue rest of home medications





FEN:


   Fluids: NS@75cc/hr


   Electrolyte abnormalities: Hyponatremia as above


   Nutrition: Regular diet





PPX:


   DVT - Lovenox SQ


   GI - not indicated





Dispo: Can monitor overnight and likely transfer to /S in AM


Miguel Leigh, DO - IM PGY-2





Visit type





- Emergency Visit


Emergency Visit: Yes


ED Registration Date: 06/05/19


Care time: The patient presented to the Emergency Department on the above date 

and was hospitalized for further evaluation of their emergent condition.





- New Patient


This patient is new to me today: Yes


Date on this admission: 06/05/19





- Critical Care


Critical Care patient: Yes


Total Critical Care Time (in minutes): 35


Critical Care Statement: The care of this patient involved high complexity 

decision making to prevent further life threatening deterioration of the patient

's condition and/or to evaluate & treat vital organ system(s) failure or risk 

of failure.

## 2019-06-06 LAB
ANION GAP SERPL CALC-SCNC: 8 MMOL/L (ref 8–16)
BUN SERPL-MCNC: 10 MG/DL (ref 7–18)
CALCIUM SERPL-MCNC: 8.4 MG/DL (ref 8.5–10.1)
CHLORIDE SERPL-SCNC: 103 MMOL/L (ref 98–107)
CO2 SERPL-SCNC: 27 MMOL/L (ref 21–32)
CREAT SERPL-MCNC: 0.8 MG/DL (ref 0.55–1.3)
DEPRECATED RDW RBC AUTO: 13.2 % (ref 11.6–15.6)
GLUCOSE SERPL-MCNC: 115 MG/DL (ref 74–106)
HCT VFR BLD CALC: 31.2 % (ref 32.4–45.2)
HGB BLD-MCNC: 10.4 GM/DL (ref 10.7–15.3)
MAGNESIUM SERPL-MCNC: 2.2 MG/DL (ref 1.8–2.4)
MCH RBC QN AUTO: 30.4 PG (ref 25.7–33.7)
MCHC RBC AUTO-ENTMCNC: 33.4 G/DL (ref 32–36)
MCV RBC: 91.2 FL (ref 80–96)
PHOSPHATE SERPL-MCNC: 2.3 MG/DL (ref 2.5–4.9)
PLATELET # BLD AUTO: 177 K/MM3 (ref 134–434)
PMV BLD: 9 FL (ref 7.5–11.1)
POTASSIUM SERPLBLD-SCNC: 3.5 MMOL/L (ref 3.5–5.1)
RBC # BLD AUTO: 3.42 M/MM3 (ref 3.6–5.2)
SODIUM SERPL-SCNC: 138 MMOL/L (ref 136–145)
WBC # BLD AUTO: 9.3 K/MM3 (ref 4–10)

## 2019-06-06 RX ADMIN — LAMOTRIGINE SCH MG: 100 TABLET ORAL at 21:39

## 2019-06-06 RX ADMIN — ENOXAPARIN SODIUM SCH MG: 40 INJECTION SUBCUTANEOUS at 13:22

## 2019-06-06 RX ADMIN — ATORVASTATIN CALCIUM SCH MG: 10 TABLET, FILM COATED ORAL at 21:39

## 2019-06-06 RX ADMIN — ACETAMINOPHEN PRN MG: 325 TABLET ORAL at 09:33

## 2019-06-06 RX ADMIN — ACETAMINOPHEN PRN MG: 325 TABLET ORAL at 03:28

## 2019-06-06 RX ADMIN — ACETAMINOPHEN PRN MG: 325 TABLET ORAL at 17:53

## 2019-06-06 RX ADMIN — LAMOTRIGINE SCH MG: 100 TABLET ORAL at 13:21

## 2019-06-06 RX ADMIN — PRAMIPEXOLE DIHYDROCHLORIDE SCH MG: 0.25 TABLET ORAL at 21:39

## 2019-06-06 RX ADMIN — PSEUDOEPHEDRINE HCL PRN MG: 30 TABLET, FILM COATED ORAL at 17:55

## 2019-06-06 RX ADMIN — SALINE NASAL SPRAY PRN SPRAY: 1.5 SOLUTION NASAL at 17:56

## 2019-06-06 NOTE — PN
Progress Note, Physician


History of Present Illness: 





patient feels much better


no complaints


patient was tachypnoeic this morning


o2 sat was 91


temp was 99.1


currently on nasal canula





- Current Medication List


Current Medications: 


Active Medications





Acetaminophen (Tylenol -)  650 mg PO Q6H PRN


   PRN Reason: PAIN LEVEL 1-5


   Last Admin: 06/06/19 09:33 Dose:  650 mg


Atorvastatin Calcium (Lipitor -)  10 mg PO [facility]


   Last Admin: 06/05/19 21:52 Dose:  10 mg


Clonazepam (Klonopin -)  1 mg PO HS [facility]


   Last Admin: 06/05/19 21:52 Dose:  1 mg


Enoxaparin Sodium (Lovenox -)  40 mg SQ DAILY [facility]


   Last Admin: 06/06/19 13:22 Dose:  40 mg


Lamotrigine (Lamictal -)  100 mg PO BID [facility]


   Last Admin: 06/06/19 13:21 Dose:  100 mg


Pramipexole Dihydrochloride (Mirapex -)  0.25 mg PO [facility]


   Last Admin: 06/05/19 21:52 Dose:  0.25 mg


Pseudoephedrine HCl (Sudafed -)  30 mg PO Q6H PRN


   PRN Reason: NASAL CONGESTION


Sodium Chloride (Ocean Spray Nasal Spray -)  2 spray NS TID PRN


   PRN Reason: NASAL CONGESTION











- Objective


Vital Signs: 


 Vital Signs











Temperature  98.1 F   06/06/19 11:30


 


Pulse Rate  79   06/06/19 11:30


 


Respiratory Rate  24 H  06/06/19 11:30


 


Blood Pressure  111/67   06/06/19 11:30


 


O2 Sat by Pulse Oximetry (%)  99   06/05/19 21:00











Constitutional: Yes: No Distress, Calm


Cardiovascular: Yes: Regular Rate and Rhythm


Respiratory: Yes: Regular, On Nasal O2, Poor Air Entry (bases)


Gastrointestinal: Yes: Normal Bowel Sounds, Soft


Musculoskeletal: Yes: WNL


Extremities: Yes: WNL


Neurological: Yes: Alert, Oriented


Psychiatric: Yes: Alert, Oriented


Labs: 


 CBC, BMP





 06/06/19 07:00 





 06/06/19 07:00 





 INR, PTT











INR  1.11  (0.83-1.09)  H  06/05/19  06:00    














Assessment/Plan





r/o Osteomyelitis


Sepsis


Hyponatremia


HTN


Hyperlipidemia


Anxiety





-i have discussed with the family in great detail about all the scenarios


i have also discussed with radiology about the findings of her spine


will ordered


 repeat crp and esr


will continue abx


procalcitonin has been send


await for that test result





family understands what i have said

## 2019-06-06 NOTE — PN
Progress Note, Physician


Chief Complaint: 





lower back paiin, fever and chills x 2 days


History of Present Illness: 


78 year-old female with a PMH significant for HTN, HLD, OA, and chronic back 

pain. Arrived in [place] on 5/30 from [place] to visit daughter. Presented to ED with 

complaint of home-recorded fever of 102.9, myalgias, and chills since last 

night. Patient reports having lumbar spine steroid injections for the first 

time on 5/23 and 5/29 with a pain management doctor in [place]. Patient has had 

a mild non-productive cough, visiting with young family members with assorted 

upper respiratory symptoms. No nausea, vomiting, diarrhea. No dysuria, frequency

, urgency. 











- Current Medication List


Current Medications: 


Active Medications





Acetaminophen (Tylenol -)  650 mg PO Q6H PRN


   PRN Reason: PAIN LEVEL 1-5


   Last Admin: 06/06/19 03:28 Dose:  650 mg


Atorvastatin Calcium (Lipitor -)  10 mg PO [facility]


   Last Admin: 06/05/19 21:52 Dose:  10 mg


Chlorhexidine Gluconate (Hibiclens For Decolonization -)  1 applic TP HS [facility]


Clonazepam (Klonopin -)  1 mg PO [facility]


   Last Admin: 06/05/19 21:52 Dose:  1 mg


Enoxaparin Sodium (Lovenox -)  40 mg SQ DAILY [facility]


Vancomycin HCl (Vancomycin (Pre-Docked))  1,000 mg in 250 mls @ 250 mls/hr IVPB 

ONCE ONE


   Stop: 06/06/19 09:14


Ceftriaxone Sodium 1 gm/ (Dextrose)  50 mls @ 100 mls/hr IVPB ONCE ONE


   Stop: 06/06/19 08:44


Lamotrigine (Lamictal -)  100 mg PO BID [facility]


   Last Admin: 06/05/19 21:53 Dose:  100 mg


Mupirocin (Bactroban Ointment (For Decolonization) -)  1 applic NS BID [facility]


   Stop: 06/10/19 09:59


Pramipexole Dihydrochloride (Mirapex -)  0.25 mg PO [facility]


   Last Admin: 06/05/19 21:52 Dose:  0.25 mg











- Objective


Vital Signs: 


 Vital Signs











Temperature  98.4 F   06/06/19 06:00


 


Pulse Rate  90   06/06/19 06:00


 


Respiratory Rate  20   06/06/19 06:00


 


Blood Pressure  131/71   06/06/19 06:00


 


O2 Sat by Pulse Oximetry (%)  99   06/05/19 21:00











Constitutional: Yes: Well Nourished, No Distress, Anxious


Eyes: Yes: WNL, Conjunctiva Clear, EOM Intact


HENT: Yes: WNL, Atraumatic, Normocephalic


Neck: Yes: WNL, Supple, Trachea Midline


Cardiovascular: Yes: WNL, Regular Rate and Rhythm


Respiratory: Yes: WNL, Regular, CTA Bilaterally


Gastrointestinal: Yes: WNL, Normal Bowel Sounds


...Rectal Exam: Yes: Deferred


Genitourinary: Yes: WNL


Musculoskeletal: Yes: Back Pain (less today)


Extremities: Yes: WNL


Edema: No


Peripheral Pulses WNL: Yes


Integumentary: Yes: WNL


Neurological: Yes: WNL, Alert, Oriented


...Motor Strength: WNL


Psychiatric: Yes: WNL, Alert, Oriented


Labs: 


 CBC, BMP





 06/06/19 07:00 





 06/06/19 07:00 





 INR, PTT











INR  1.11  (0.83-1.09)  H  06/05/19  06:00    














- ....Imaging


Chest X-ray: Report Reviewed, Image Reviewed


MRI: Report Reviewed


EKG: Report Reviewed, Image Reviewed





Problem List





- Problems


(1) Anxiety


Assessment/Plan: 


continue  home dose of klonopin.


emotional support provided


spoke with aptient with daughter and  present


Code(s): F41.9 - ANXIETY DISORDER, UNSPECIFIED   





(2) Back pain


Assessment/Plan: 


treating for osteomyleotitis vs synovitis


back with chronic LBP recently treated with steroid injection


may apply ice for 20 min intervals to lower back





Code(s): M54.9 - DORSALGIA, UNSPECIFIED   





(3) HTN (hypertension)


Assessment/Plan: 


home dose of benzepril held, normotensive presently


will reasses daily for re-introduction on antihypertensives


Code(s): I10 - ESSENTIAL (PRIMARY) HYPERTENSION   





(4) Hyponatremia


Assessment/Plan: 


resolved


Na 138


daily CMP


Code(s): E87.1 - HYPO-OSMOLALITY AND HYPONATREMIA   





(5) Osteomyelitis


Assessment/Plan: 


on MRI patient found to have active inflammatory facet synovitis L3-L4-L5 w/ 

soft tissue inflammation; L5-S1 soft tissue inflammation and bone marrow 

enhancement


-patient started on empiric Zosyn/Ceftriaxone/Vancomycin in ED. Cont 

ceftriaxone pending cultures. 


-Dr. Kumar following and spoke to patient/family


-procalcitonin sent


-viral swabs and cdif sent to r/o other sources of infection


-








Code(s): M86.9 - OSTEOMYELITIS, UNSPECIFIED   





Impression/Plan


Impression/Plan: 








F/E/N


dc IVF


monitor electrolytes


sodium controlled diet





dvt PPX: lovenox sq


Gi PPX: not needed





dispo: 


maintain as inpatient


discharge planning-SUNNY/KEVIN spoke to home infusion agency if IV abx are needed as 

outpatient


full code








Visit type





- Emergency Visit


Emergency Visit: Yes


ED Registration Date: 06/05/19


Care time: The patient presented to the Emergency Department on the above date 

and was hospitalized for further evaluation of their emergent condition.





- New Patient


This patient is new to me today: No





- Critical Care


Critical Care patient: No





- Discharge Referral


Referred to [facility] No

## 2019-06-07 LAB
ALBUMIN SERPL-MCNC: 2.7 G/DL (ref 3.4–5)
ALP SERPL-CCNC: 87 U/L (ref 45–117)
ALT SERPL-CCNC: 22 U/L (ref 13–61)
ANION GAP SERPL CALC-SCNC: 9 MMOL/L (ref 8–16)
AST SERPL-CCNC: 30 U/L (ref 15–37)
BASOPHILS # BLD: 0.3 % (ref 0–2)
BILIRUB SERPL-MCNC: 0.5 MG/DL (ref 0.2–1)
BUN SERPL-MCNC: 8 MG/DL (ref 7–18)
CALCIUM SERPL-MCNC: 8.3 MG/DL (ref 8.5–10.1)
CHLORIDE SERPL-SCNC: 103 MMOL/L (ref 98–107)
CO2 SERPL-SCNC: 27 MMOL/L (ref 21–32)
CREAT SERPL-MCNC: 0.8 MG/DL (ref 0.55–1.3)
DEPRECATED RDW RBC AUTO: 13.2 % (ref 11.6–15.6)
EOSINOPHIL # BLD: 0.2 % (ref 0–4.5)
GLUCOSE SERPL-MCNC: 113 MG/DL (ref 74–106)
HCT VFR BLD CALC: 28.7 % (ref 32.4–45.2)
HGB BLD-MCNC: 9.7 GM/DL (ref 10.7–15.3)
LYMPHOCYTES # BLD: 16.6 % (ref 8–40)
MAGNESIUM SERPL-MCNC: 2.1 MG/DL (ref 1.8–2.4)
MCH RBC QN AUTO: 30.9 PG (ref 25.7–33.7)
MCHC RBC AUTO-ENTMCNC: 33.9 G/DL (ref 32–36)
MCV RBC: 91.2 FL (ref 80–96)
MONOCYTES # BLD AUTO: 9.4 % (ref 3.8–10.2)
NEUTROPHILS # BLD: 73.5 % (ref 42.8–82.8)
PLATELET # BLD AUTO: 172 K/MM3 (ref 134–434)
PMV BLD: 9.2 FL (ref 7.5–11.1)
POTASSIUM SERPLBLD-SCNC: 3.4 MMOL/L (ref 3.5–5.1)
PROT SERPL-MCNC: 5.4 G/DL (ref 6.4–8.2)
RBC # BLD AUTO: 3.15 M/MM3 (ref 3.6–5.2)
SODIUM SERPL-SCNC: 138 MMOL/L (ref 136–145)
WBC # BLD AUTO: 5.2 K/MM3 (ref 4–10)

## 2019-06-07 PROCEDURE — 02HV33Z INSERTION OF INFUSION DEVICE INTO SUPERIOR VENA CAVA, PERCUTANEOUS APPROACH: ICD-10-PCS | Performed by: RADIOLOGY

## 2019-06-07 RX ADMIN — ENOXAPARIN SODIUM SCH MG: 40 INJECTION SUBCUTANEOUS at 10:55

## 2019-06-07 RX ADMIN — CEFTRIAXONE SODIUM SCH MLS/HR: 2 INJECTION, POWDER, FOR SOLUTION INTRAMUSCULAR; INTRAVENOUS at 10:55

## 2019-06-07 RX ADMIN — LAMOTRIGINE SCH MG: 100 TABLET ORAL at 10:57

## 2019-06-07 RX ADMIN — ATORVASTATIN CALCIUM SCH MG: 10 TABLET, FILM COATED ORAL at 21:54

## 2019-06-07 RX ADMIN — PRAMIPEXOLE DIHYDROCHLORIDE SCH MG: 0.25 TABLET ORAL at 21:53

## 2019-06-07 RX ADMIN — LAMOTRIGINE SCH MG: 100 TABLET ORAL at 21:54

## 2019-06-07 RX ADMIN — ACETAMINOPHEN PRN MG: 325 TABLET ORAL at 01:51

## 2019-06-07 NOTE — PN
Progress Note, Physician


Chief Complaint: 





lower back paiin, fever and chills x 2 days


History of Present Illness: 


78 year-old female with a PMH significant for HTN, HLD, OA, and chronic back 

pain. Arrived in [place] on 5/30 from [place] to visit daughter. Presented to ED with 

complaint of home-recorded fever of 102.9, myalgias, and chills since last 

night. Patient reports having lumbar spine steroid injections for the first 

time on 5/23 and 5/29 with a pain management doctor in [place]. Patient has had 

a mild non-productive cough, visiting with young family members with assorted 

upper respiratory symptoms. No nausea, vomiting, diarrhea. No dysuria, frequency

, urgency. 











- Current Medication List


Current Medications: 


Active Medications





Acetaminophen (Tylenol -)  650 mg PO Q6H PRN


   PRN Reason: PAIN LEVEL 1-5


   Last Admin: 06/07/19 01:51 Dose:  650 mg


Atorvastatin Calcium (Lipitor -)  10 mg PO [facility]


   Last Admin: 06/06/19 21:39 Dose:  10 mg


Clonazepam (Klonopin -)  1 mg PO [facility]


   Last Admin: 06/06/19 21:39 Dose:  1 mg


Enoxaparin Sodium (Lovenox -)  40 mg SQ DAILY [facility]


   Last Admin: 06/06/19 13:22 Dose:  40 mg


Lamotrigine (Lamictal -)  100 mg PO BID [facility]


   Last Admin: 06/06/19 21:39 Dose:  100 mg


Pramipexole Dihydrochloride (Mirapex -)  0.25 mg PO [facility]


   Last Admin: 06/06/19 21:39 Dose:  0.25 mg


Pseudoephedrine HCl (Sudafed -)  30 mg PO Q6H PRN


   PRN Reason: NASAL CONGESTION


   Last Admin: 06/06/19 17:55 Dose:  30 mg


Sodium Chloride (Ocean Spray Nasal Spray -)  2 spray NS TID PRN


   PRN Reason: NASAL CONGESTION


   Last Admin: 06/06/19 17:56 Dose:  2 spray











- Objective


Vital Signs: 


 Vital Signs











Temperature  98.8 F   06/07/19 06:00


 


Pulse Rate  77   06/07/19 06:00


 


Respiratory Rate  22 H  06/07/19 06:00


 


Blood Pressure  98/54 L  06/07/19 06:00


 


O2 Sat by Pulse Oximetry (%)  94 L  06/06/19 21:00











Constitutional: Yes: Well Nourished, No Distress, Anxious (crying during 

encounter)


Eyes: Yes: WNL, Conjunctiva Clear, EOM Intact


HENT: Yes: WNL, Atraumatic, Normocephalic


Neck: Yes: WNL, Supple, Trachea Midline


Cardiovascular: Yes: WNL, Regular Rate and Rhythm


Respiratory: Yes: WNL, Regular, CTA Bilaterally


Gastrointestinal: Yes: WNL, Normal Bowel Sounds, Soft


...Rectal Exam: Yes: Deferred


Genitourinary: Yes: WNL


Extremities: Yes: WNL


Edema: No


Peripheral Pulses WNL: Yes


Neurological: Yes: WNL, Alert, Oriented


...Motor Strength: WNL


Psychiatric: Yes: WNL, Alert, Oriented


Labs: 


 INR, PTT











INR  1.11  (0.83-1.09)  H  06/05/19  06:00    














- ....Imaging


Chest X-ray: Report Reviewed, Image Reviewed


EKG: Report Reviewed, Image Reviewed





Problem List





- Problems


(1) Anxiety


Assessment/Plan: 


increased  home dose of klonopin to q8 H prn


emotional support provided


spoke with patient with daughter and  present


Code(s): F41.9 - ANXIETY DISORDER, UNSPECIFIED   





(2) Back pain


Assessment/Plan: 


treating for osteomyleotitis vs synovitis


back with chronic LBP recently treated with steroid injection


may apply ice for 20 min intervals to lower back





Code(s): M54.9 - DORSALGIA, UNSPECIFIED   





(3) HTN (hypertension)


Assessment/Plan: 


home dose of benzepril held, normotensive presently


will reasses daily for re-introduction on antihypertensives


Code(s): I10 - ESSENTIAL (PRIMARY) HYPERTENSION   





(4) Hyponatremia


Assessment/Plan: 


resolved


Na 138


daily CMP


Code(s): E87.1 - HYPO-OSMOLALITY AND HYPONATREMIA   





(5) Osteomyelitis


Assessment/Plan: 


on MRI patient found to have active inflammatory facet synovitis L3-L4-L5 w/ 

soft tissue inflammation; L5-S1 soft tissue inflammation and bone marrow 

enhancement


-patient started on empiric Zosyn/Ceftriaxone/Vancomycin in ED. Vanco by level 

and ceftriaxone pending cultures. 


-Dr. Kumar following and spoke to patient/family. Plan for course of 6week 

therapy


-plan for PICC today


-home infusion therapy requested by SW/CN-appreciate help


-procalcitonin sent


-ESR 63, ESR 14.4


-viral swabs and cdif sent to r/o other sources of infection


-








Code(s): M86.9 - OSTEOMYELITIS, UNSPECIFIED   





Impression/Plan


Impression/Plan: 








F/E/N


dc IVF


monitor electrolytes


sodium controlled diet





dvt PPX: lovenox sq


Gi PPX: not needed





dispo: 


maintain as inpatient


discharge planning-SW/CM spoke to home infusion agency if IV abx are needed as 

outpatient


full code








Visit type





- Emergency Visit


Emergency Visit: Yes


ED Registration Date: 06/05/19


Care time: The patient presented to the Emergency Department on the above date 

and was hospitalized for further evaluation of their emergent condition.





- New Patient


This patient is new to me today: No





- Critical Care


Critical Care patient: No





- Discharge Referral


Referred to [facility] No

## 2019-06-07 NOTE — PN
Progress Note, Physician


History of Present Illness: 





stable


c/o of back pain





- Current Medication List


Current Medications: 


Active Medications





Acetaminophen (Tylenol -)  650 mg PO Q6H PRN


   PRN Reason: PAIN LEVEL 1-5


   Last Admin: 06/07/19 01:51 Dose:  650 mg


Atorvastatin Calcium (Lipitor -)  10 mg PO HS [facility]


   Last Admin: 06/06/19 21:39 Dose:  10 mg


Clonazepam (Klonopin -)  1 mg PO HS [facility]


   Last Admin: 06/06/19 21:39 Dose:  1 mg


Enoxaparin Sodium (Lovenox -)  40 mg SQ DAILY [facility]


   Last Admin: 06/06/19 13:22 Dose:  40 mg


Lamotrigine (Lamictal -)  100 mg PO BID [facility]


   Last Admin: 06/06/19 21:39 Dose:  100 mg


Pramipexole Dihydrochloride (Mirapex -)  0.25 mg PO HS [facility]


   Last Admin: 06/06/19 21:39 Dose:  0.25 mg


Pseudoephedrine HCl (Sudafed -)  30 mg PO Q6H PRN


   PRN Reason: NASAL CONGESTION


   Last Admin: 06/06/19 17:55 Dose:  30 mg


Sodium Chloride (Ocean Spray Nasal Spray -)  2 spray NS TID PRN


   PRN Reason: NASAL CONGESTION


   Last Admin: 06/06/19 17:56 Dose:  2 spray











- Objective


Vital Signs: 


 Vital Signs











Temperature  98.8 F   06/07/19 06:00


 


Pulse Rate  77   06/07/19 06:00


 


Respiratory Rate  22 H  06/07/19 06:00


 


Blood Pressure  98/54 L  06/07/19 06:00


 


O2 Sat by Pulse Oximetry (%)  94 L  06/06/19 21:00











Constitutional: Yes: No Distress, Calm


Cardiovascular: Yes: Regular Rate and Rhythm


Respiratory: Yes: Regular, CTA Bilaterally


Gastrointestinal: Yes: Normal Bowel Sounds, Soft


Musculoskeletal: Yes: WNL


Extremities: Yes: Other


Neurological: Yes: Alert, Oriented


Psychiatric: Yes: Alert, Oriented


Labs: 


 CBC, BMP





 06/07/19 05:56 





 06/07/19 05:56 





 INR, PTT











INR  1.11  (0.83-1.09)  H  06/05/19  06:00    














Assessment/Plan





r/o Osteomyelitis


Sepsis


Hyponatremia


HTN


Hyperlipidemia


Anxiety





will check vanco level


will dose vanco after the level


await for all results


increase in c reactive protein

## 2019-06-08 VITALS — SYSTOLIC BLOOD PRESSURE: 118 MMHG | HEART RATE: 80 BPM | TEMPERATURE: 98.2 F | DIASTOLIC BLOOD PRESSURE: 71 MMHG

## 2019-06-08 LAB
ALBUMIN SERPL-MCNC: 2.6 G/DL (ref 3.4–5)
ALP SERPL-CCNC: 88 U/L (ref 45–117)
ALT SERPL-CCNC: 27 U/L (ref 13–61)
ANION GAP SERPL CALC-SCNC: 7 MMOL/L (ref 8–16)
AST SERPL-CCNC: 33 U/L (ref 15–37)
BASOPHILS # BLD: 0.5 % (ref 0–2)
BILIRUB SERPL-MCNC: 0.4 MG/DL (ref 0.2–1)
BUN SERPL-MCNC: 9.2 MG/DL (ref 7–18)
CALCIUM SERPL-MCNC: 8.5 MG/DL (ref 8.5–10.1)
CHLORIDE SERPL-SCNC: 102 MMOL/L (ref 98–107)
CO2 SERPL-SCNC: 29 MMOL/L (ref 21–32)
CREAT SERPL-MCNC: 0.7 MG/DL (ref 0.55–1.3)
DEPRECATED RDW RBC AUTO: 13.5 % (ref 11.6–15.6)
EOSINOPHIL # BLD: 4.6 % (ref 0–4.5)
GLUCOSE SERPL-MCNC: 106 MG/DL (ref 74–106)
HCT VFR BLD CALC: 29.8 % (ref 32.4–45.2)
HGB BLD-MCNC: 10.1 GM/DL (ref 10.7–15.3)
LYMPHOCYTES # BLD: 18 % (ref 8–40)
MAGNESIUM SERPL-MCNC: 2.2 MG/DL (ref 1.8–2.4)
MCH RBC QN AUTO: 30.6 PG (ref 25.7–33.7)
MCHC RBC AUTO-ENTMCNC: 33.8 G/DL (ref 32–36)
MCV RBC: 90.5 FL (ref 80–96)
MONOCYTES # BLD AUTO: 11.7 % (ref 3.8–10.2)
NEUTROPHILS # BLD: 65.2 % (ref 42.8–82.8)
PLATELET # BLD AUTO: 185 K/MM3 (ref 134–434)
PMV BLD: 8.6 FL (ref 7.5–11.1)
POTASSIUM SERPLBLD-SCNC: 3.5 MMOL/L (ref 3.5–5.1)
PROT SERPL-MCNC: 5.6 G/DL (ref 6.4–8.2)
RBC # BLD AUTO: 3.29 M/MM3 (ref 3.6–5.2)
SODIUM SERPL-SCNC: 138 MMOL/L (ref 136–145)
WBC # BLD AUTO: 4.5 K/MM3 (ref 4–10)

## 2019-06-08 RX ADMIN — SALINE NASAL SPRAY PRN SPRAY: 1.5 SOLUTION NASAL at 09:36

## 2019-06-08 RX ADMIN — PSEUDOEPHEDRINE HCL PRN MG: 30 TABLET, FILM COATED ORAL at 09:32

## 2019-06-08 RX ADMIN — ENOXAPARIN SODIUM SCH MG: 40 INJECTION SUBCUTANEOUS at 09:31

## 2019-06-08 RX ADMIN — LAMOTRIGINE SCH MG: 100 TABLET ORAL at 09:33

## 2019-06-08 RX ADMIN — CEFTRIAXONE SODIUM SCH MLS/HR: 2 INJECTION, POWDER, FOR SOLUTION INTRAMUSCULAR; INTRAVENOUS at 10:44

## 2019-06-08 NOTE — DS
Physical Examination


Vital Signs: 


 Vital Signs











Temperature  98.6 F   06/08/19 09:49


 


Pulse Rate  79   06/08/19 09:49


 


Respiratory Rate  20   06/08/19 09:49


 


Blood Pressure  101/65   06/08/19 09:49


 


O2 Sat by Pulse Oximetry (%)  95   06/07/19 21:00











Constitutional: Yes: Well Nourished, No Distress, Calm


Eyes: Yes: WNL, Conjunctiva Clear, EOM Intact


HENT: Yes: WNL, Atraumatic, Normocephalic


Neck: Yes: WNL, Supple, Trachea Midline


Cardiovascular: Yes: WNL, Regular Rate and Rhythm


Respiratory: Yes: WNL, Regular, CTA Bilaterally


Gastrointestinal: Yes: WNL, Normal Bowel Sounds, Soft


...Rectal Exam: Yes: Deferred


Renal/: Yes: WNL


Musculoskeletal: Yes: Back Pain


Extremities: Yes: WNL, Other (right PICC in place)


Edema: No


Peripheral Pulses WNL: Yes


Integumentary: Yes: WNL


Neurological: Yes: WNL, Alert, Oriented


...Motor Strength: WNL


Psychiatric: Yes: WNL, Alert, Oriented


Labs: 


 CBC, BMP





 06/08/19 07:30 





 06/08/19 07:30 











Discharge Summary


Reason For Visit: COUGH, HYPONTREMIA


Current Active Problems





Anxiety (Acute)


Back pain (Acute)


Cough (Acute)


HTN (hypertension) (Acute)


Hyponatremia (Acute)


Osteomyelitis (Acute)








Other Procedures: PICC inserted in intervention radiology to right arm on 6/7/ 2019


Hospital Course: 





Progress Note, Physician


Chief Complaint: 





lower back paiin, fever and chills x 2 days


History of Present Illness: 


78 year-old female with a PMH significant for HTN, HLD, OA, and chronic back 

pain. Arrived in [place] on 5/30 from [place] to visit daughter. Presented to ED with 

complaint of home-recorded fever of 102.9, myalgias, and chills since last 

night. Patient reports having lumbar spine steroid injections for the first 

time on 5/23 and 5/29 with a pain management doctor in [place]. Patient has had 

a mild non-productive cough, visiting with young family members with assorted 

upper respiratory symptoms. No nausea, vomiting, diarrhea. No dysuria, frequency

, urgency. 











- Current Medication List


Current Medications: 


Active Medications





Acetaminophen (Tylenol -)  650 mg PO Q6H PRN


   PRN Reason: PAIN LEVEL 1-5


   Last Admin: 06/07/19 01:51 Dose:  650 mg


Atorvastatin Calcium (Lipitor -)  10 mg PO [facility]


   Last Admin: 06/06/19 21:39 Dose:  10 mg


Clonazepam (Klonopin -)  1 mg PO HS [facility]


   Last Admin: 06/06/19 21:39 Dose:  1 mg


Enoxaparin Sodium (Lovenox -)  40 mg SQ DAILY [facility]


   Last Admin: 06/06/19 13:22 Dose:  40 mg


Lamotrigine (Lamictal -)  100 mg PO BID [facility]


   Last Admin: 06/06/19 21:39 Dose:  100 mg


Pramipexole Dihydrochloride (Mirapex -)  0.25 mg PO [facility]


   Last Admin: 06/06/19 21:39 Dose:  0.25 mg


Pseudoephedrine HCl (Sudafed -)  30 mg PO Q6H PRN


   PRN Reason: NASAL CONGESTION


   Last Admin: 06/06/19 17:55 Dose:  30 mg


Sodium Chloride (Ocean Spray Nasal Spray -)  2 spray NS TID PRN


   PRN Reason: NASAL CONGESTION


   Last Admin: 06/06/19 17:56 Dose:  2 spray











- Objective


Vital Signs: 


 Vital Signs











Temperature  98.8 F   06/07/19 06:00


 


Pulse Rate  77   06/07/19 06:00


 


Respiratory Rate  22 H  06/07/19 06:00


 


Blood Pressure  98/54 L  06/07/19 06:00


 


O2 Sat by Pulse Oximetry (%)  94 L  06/06/19 21:00











Constitutional: Yes: Well Nourished, No Distress, Anxious (crying during 

encounter)


Eyes: Yes: WNL, Conjunctiva Clear, EOM Intact


HENT: Yes: WNL, Atraumatic, Normocephalic


Neck: Yes: WNL, Supple, Trachea Midline


Cardiovascular: Yes: WNL, Regular Rate and Rhythm


Respiratory: Yes: WNL, Regular, CTA Bilaterally


Gastrointestinal: Yes: WNL, Normal Bowel Sounds, Soft


...Rectal Exam: Yes: Deferred


Genitourinary: Yes: WNL


Extremities: Yes: WNL


Edema: No


Peripheral Pulses WNL: Yes


Neurological: Yes: WNL, Alert, Oriented


...Motor Strength: WNL


Psychiatric: Yes: WNL, Alert, Oriented


Labs: 


 INR, PTT











INR  1.11  (0.83-1.09)  H  06/05/19  06:00    














- ....Imaging


Chest X-ray: Report Reviewed, Image Reviewed


EKG: Report Reviewed, Image Reviewed





Problem List





- Problems


(1) Anxiety


Assessment/Plan: 


Continue home dose on Klonipin


emotional support provided





Code(s): F41.9 - ANXIETY DISORDER, UNSPECIFIED   





(2) Back pain


Assessment/Plan: 


treating for synovitis


back with chronic LBP recently treated with steroid injection


may apply ice for 20 min intervals to lower back





Code(s): M54.9 - DORSALGIA, UNSPECIFIED   





(3) HTN (hypertension)


Assessment/Plan: 


 resume home dose of benzepril


Code(s): I10 - ESSENTIAL (PRIMARY) HYPERTENSION   





(4) Hyponatremia


Assessment/Plan: 


resolved


Code(s): E87.1 - HYPO-OSMOLALITY AND HYPONATREMIA   





(5) Osteomyelitis


Assessment/Plan: 


on MRI patient found to have active inflammatory facet synovitis L3-L4-L5 w/ 

soft tissue inflammation; L5-S1 soft tissue inflammation and bone marrow 

enhancement


Continue Vanco and ceftriaxone-corse of 5 days in hospital and will continue 

for 6 weeks after discharge


-procalcitonin sent and pending


-ESR 63, ESR 14.4


Code(s): M86.9 - OSTEOMYELITIS, UNSPECIFIED   





Impression/Plan


Impression/Plan: 





Patient to be discharged home/daughters home with infusion therapy in place. 

Resume full activty as tolerated. Resume home diet. Keep PICC arm dry and 

clean. Care to be done initially by [facility]. 


Thank you for allowing me to be involved in Mrs. Caldwell's care








- Instructions


Diet, Activity, Other Instructions: 


resume regular diet


Disposition: [facility]/HOME HEALTH CARE





- Home Medications


Comprehensive Discharge Medication List: 


Ambulatory Orders





Benazepril/Hydrochlorothiazide [Benazepril-Hctz 10-12.5 mg Tab] 1 each PO BID 06 /04/19 


Clonazepam 1 mg PO HS 06/04/19 


Lamotrigine 100 mg PO BID 06/04/19 


Meloxicam [Mobic (Nf) -] 15 mg PO DAILY 06/04/19 


Pramipexole Dihydrochloride 0.25 mg PO HS 06/04/19 


Simvastatin 20 mg PO HS 06/04/19 


traZODone HCL [Trazodone HCl] 50 mg PO HS 06/04/19 


Pseudoephedrine HCl [Sudafed -] 30 mg PO Q6H PRN  tablet 06/07/19 


Sodium Chloride Nasal Spray [Ocean Spray Nasal Spray -] 2 spray NS TID PRN  

spray 06/07/19 


Acetaminophen [Tylenol .Regular Strength -] 325 mg PO Q6H PRN  tablet 06/08/19 








This patient is new to me today: No


Emergency Visit: Yes


ED Registration Date: 06/05/19


Care time: The patient presented to the Emergency Department on the above date 

and was hospitalized for further evaluation of their emergent condition.


Critical Care patient: No





- Discharge Referral


Referred to [facility] No

## 2019-06-08 NOTE — PN
Progress Note, Physician


History of Present Illness: 





Pt seen and examined. Events reviewed. Pt states she has less back pain. 

Tolerating antibiotics. Has no new complaints. Wishes to go home.





- Current Medication List


Current Medications: 


Active Medications





Acetaminophen (Tylenol -)  650 mg PO Q6H PRN


   PRN Reason: PAIN LEVEL 1-5


   Last Admin: 06/07/19 01:51 Dose:  650 mg


Acetaminophen (Tylenol -)  325 mg PO Q6H PRN


   PRN Reason: PAIN 6-10


Atorvastatin Calcium (Lipitor -)  10 mg PO [facility]


   Last Admin: 06/07/19 21:54 Dose:  10 mg


Clonazepam (Klonopin -)  1 mg PO BID [facility]N


   PRN Reason: ANXIETY


   Last Admin: 06/07/19 23:59 Dose:  1 mg


Enoxaparin Sodium (Lovenox -)  40 mg SQ DAILY [facility]


   Last Admin: 06/08/19 09:31 Dose:  40 mg


Ceftriaxone Sodium 2 gm/ (Dextrose)  100 mls @ 100 mls/hr IVPB DAILY [facility]; 

Protocol


   Last Admin: 06/08/19 10:44 Dose:  100 mls/hr


Lamotrigine (Lamictal -)  100 mg PO BID [facility]


   Last Admin: 06/08/19 09:33 Dose:  100 mg


Pramipexole Dihydrochloride (Mirapex -)  0.25 mg PO [facility]


   Last Admin: 06/07/19 21:53 Dose:  0.25 mg


Pseudoephedrine HCl (Sudafed -)  30 mg PO Q6H PRN


   PRN Reason: NASAL CONGESTION


   Last Admin: 06/08/19 09:32 Dose:  30 mg


Sodium Chloride (Ocean Spray Nasal Spray -)  2 spray NS TID PRN


   PRN Reason: NASAL CONGESTION


   Last Admin: 06/08/19 09:36 Dose:  2 spray











- Objective


Vital Signs: 


 Vital Signs











Temperature  98.6 F   06/08/19 09:49


 


Pulse Rate  79   06/08/19 09:49


 


Respiratory Rate  20   06/08/19 09:49


 


Blood Pressure  101/65   06/08/19 09:49


 


O2 Sat by Pulse Oximetry (%)  95   06/07/19 21:00











Constitutional: Yes: No Distress, Calm, Other (ambulating)


HENT: Yes: Atraumatic


Cardiovascular: Yes: Regular Rate and Rhythm


Respiratory: Yes: Regular


Gastrointestinal: Yes: Normal Bowel Sounds, Soft


Genitourinary: Yes: WNL


Musculoskeletal: Yes: WNL (less), Back Pain


Extremities: Yes: WNL


Integumentary: Yes: WNL


Neurological: Yes: Alert, Oriented


Labs: 


 CBC, BMP





 06/08/19 07:30 





 06/08/19 07:30 





 INR, PTT











INR  1.11  (0.83-1.09)  H  06/05/19  06:00    








 Laboratory Results - last 24 hr











  06/07/19 06/08/19 06/08/19





  16:40 07:30 07:30


 


WBC   4.5 


 


RBC   3.29 L 


 


Hgb   10.1 L 


 


Hct   29.8 L 


 


MCV   90.5 


 


MCH   30.6 


 


MCHC   33.8 


 


RDW   13.5 


 


MPV   8.6 


 


Absolute Neuts (auto)   2.9 


 


Neutrophils %   65.2 


 


Lymphocytes %   18.0 


 


Monocytes %   11.7 H 


 


Eosinophils %   4.6 H D 


 


Basophils %   0.5 


 


Nucleated RBC %   0 


 


Sodium    138


 


Potassium    3.5


 


Chloride    102


 


Carbon Dioxide    29


 


Anion Gap    7 L


 


BUN    9.2


 


Creatinine    0.7


 


Est GFR (CKD-EPI)AfAm    96.86


 


Est GFR (CKD-EPI)NonAf    83.57


 


Random Glucose    106


 


Calcium    8.5


 


Magnesium    2.2


 


Total Bilirubin    0.4


 


AST    33


 


ALT    27


 


Alkaline Phosphatase    88


 


Total Protein    5.6 L


 


Albumin    2.6 L


 


Vancomycin Pre-Dose  4.9 L  














Problem List





- Problems


(1) Anxiety


Code(s): F41.9 - ANXIETY DISORDER, UNSPECIFIED   





(2) Back pain


Code(s): M54.9 - DORSALGIA, UNSPECIFIED   





(3) HTN (hypertension)


Code(s): I10 - ESSENTIAL (PRIMARY) HYPERTENSION   





(4) Osteomyelitis


Code(s): M86.9 - OSTEOMYELITIS, UNSPECIFIED   





Assessment/Plan





Back Pain/ Possible OM/discitis


HTN


Anxiety 


HTN


HLD





-- Vancomycin dose was adjusted


-- Pt is for d/c home


-- needs Ceftriaxone/Vancomycin IV x 6 weeks


-- repeat Vancomycin trough prior to 4th dose


-- weekly cbc, bmp, esr, Vancomycin Trough, monitor renal function, adjust dose 

based on labs if necessary


-- repeat MRI in 6 weeks


-- pt instructed to seek immediate attention if symptoms worsen, if develops 

abd pain/diarrhea/rash or any other adverse effect while on treatment


f/u with PMD as outpt





d/w NP